# Patient Record
Sex: FEMALE
[De-identification: names, ages, dates, MRNs, and addresses within clinical notes are randomized per-mention and may not be internally consistent; named-entity substitution may affect disease eponyms.]

---

## 2022-04-12 ENCOUNTER — DASHBOARD ENCOUNTERS (OUTPATIENT)
Age: 37
End: 2022-04-12

## 2022-04-12 ENCOUNTER — OFFICE VISIT (OUTPATIENT)
Dept: URBAN - METROPOLITAN AREA CLINIC 40 | Facility: CLINIC | Age: 37
End: 2022-04-12
Payer: COMMERCIAL

## 2022-04-12 ENCOUNTER — WEB ENCOUNTER (OUTPATIENT)
Dept: URBAN - METROPOLITAN AREA CLINIC 40 | Facility: CLINIC | Age: 37
End: 2022-04-12

## 2022-04-12 VITALS
WEIGHT: 165 LBS | TEMPERATURE: 98.4 F | HEIGHT: 66 IN | DIASTOLIC BLOOD PRESSURE: 66 MMHG | BODY MASS INDEX: 26.52 KG/M2 | SYSTOLIC BLOOD PRESSURE: 112 MMHG | HEART RATE: 83 BPM

## 2022-04-12 DIAGNOSIS — R07.9 NON CARDIAC CHEST PAIN: ICD-10-CM

## 2022-04-12 PROCEDURE — 99202 OFFICE O/P NEW SF 15 MIN: CPT | Performed by: PHYSICIAN ASSISTANT

## 2022-04-12 RX ORDER — ESCITALOPRAM 10 MG/1
1 TABLET TABLET, FILM COATED ORAL ONCE A DAY
Status: ACTIVE | COMMUNITY

## 2022-04-12 RX ORDER — CETIRIZINE HYDROCHLORIDE 10 MG/1
1 TABLET TABLET, FILM COATED ORAL ONCE A DAY
Status: ACTIVE | COMMUNITY

## 2022-04-12 NOTE — HPI-TODAY'S VISIT:
Ms. Lawrence is a 37-year-old Black female who presents to the office today with complaint of chest tightness.  This has apparently been an issue since Covid infection previously.  According to primary medical doctor notes, she has been evaluated by cardiology pulmonology with normal work-up.  She is having  tightness reportedly from the pharynx and larynx down to the central portion of the chest. Nonsmoker. No alcohol, recreational drugs consumed. History of anxiety. She tells me that she has had this intermittent pain in the last 1 year without vomiting.  Does feel occasional sublingual discomfort has not been seen by ENT.  She eats a "clean" diet and often tries to stay active through yoga and walking.  This is how she decreases her stressors.  She is aware that stress may correlate with her episodes of noncardiac chest pain.  She has not been on any H2 blocker therapy or PPI and was recently given escitalopram in the ER after normal evaluation of chest pain recently.  No evidence of anemia, pregnancy test negative.  She has had CT imaging in the last year of the abdomen and pelvis without any acute GI findings.  No prior endoscopy.  Denies a family history of gastric or colonic malignancy.  Takes no other herbs or supplements, NSAIDs.  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

## 2022-04-18 ENCOUNTER — OFFICE VISIT (OUTPATIENT)
Dept: URBAN - METROPOLITAN AREA SURGERY CENTER 30 | Facility: SURGERY CENTER | Age: 37
End: 2022-04-18